# Patient Record
Sex: FEMALE | URBAN - METROPOLITAN AREA
[De-identification: names, ages, dates, MRNs, and addresses within clinical notes are randomized per-mention and may not be internally consistent; named-entity substitution may affect disease eponyms.]

---

## 2018-10-11 ENCOUNTER — OFFICE VISIT (OUTPATIENT)
Dept: URBAN - METROPOLITAN AREA CLINIC 48 | Facility: CLINIC | Age: 23
End: 2018-10-11
Payer: COMMERCIAL

## 2018-10-11 PROCEDURE — 92004 COMPRE OPH EXAM NEW PT 1/>: CPT | Performed by: OPHTHALMOLOGY

## 2018-10-11 ASSESSMENT — INTRAOCULAR PRESSURE
OS: 17
OD: 18

## 2018-10-11 NOTE — IMPRESSION/PLAN
Impression: Dry eye syndrome of bilateral lacrimal glands: H04.123. Plan: I feel that the patient's present visual complaints can be explained by dry eye syndrome rather than optic neuritis although she does fit the demographic for MS and she has a materal  uncle that had MS. No recent MS like episodes. Optic nerves wnl in each eye. Retrobulbar ON possible but patient does not have significant pain with eye movement. Sensation in eye could be from Dry eyes. Pupils are normal on tech testing today, will check myself tomorrow. Kevin Blackwood is normal in each eye. Red Desat:  REd OU, Brightness testing 85% OD, 100% OS. PF ATs 8 times a day, supplements (omega 3), warm compresses 1 time a day for 5 minutes.   

Check pupils tomorrow with Dr. Delmar Gilliland

## 2018-10-12 ENCOUNTER — OFFICE VISIT (OUTPATIENT)
Dept: URBAN - METROPOLITAN AREA CLINIC 48 | Facility: CLINIC | Age: 23
End: 2018-10-12
Payer: COMMERCIAL

## 2018-10-12 DIAGNOSIS — H04.123 DRY EYE SYNDROME OF BILATERAL LACRIMAL GLANDS: Primary | ICD-10-CM

## 2018-10-12 PROCEDURE — 99212 OFFICE O/P EST SF 10 MIN: CPT | Performed by: OPHTHALMOLOGY

## 2018-10-12 NOTE — IMPRESSION/PLAN
Impression: Dry eye syndrome of bilateral lacrimal glands: H04.123.  Plan: AFT's 2-3 x day for comfort 
rtc 2 years or sooner PRN